# Patient Record
Sex: FEMALE | Race: AMERICAN INDIAN OR ALASKA NATIVE
[De-identification: names, ages, dates, MRNs, and addresses within clinical notes are randomized per-mention and may not be internally consistent; named-entity substitution may affect disease eponyms.]

---

## 2021-01-12 ENCOUNTER — HOSPITAL ENCOUNTER (OUTPATIENT)
Dept: HOSPITAL 5 - TRG | Age: 36
Discharge: HOME | End: 2021-01-12
Attending: OBSTETRICS & GYNECOLOGY
Payer: MEDICAID

## 2021-01-12 VITALS — DIASTOLIC BLOOD PRESSURE: 71 MMHG | SYSTOLIC BLOOD PRESSURE: 126 MMHG

## 2021-01-12 DIAGNOSIS — Z3A.37: ICD-10-CM

## 2021-01-12 DIAGNOSIS — M54.9: ICD-10-CM

## 2021-01-12 DIAGNOSIS — O26.893: Primary | ICD-10-CM

## 2021-01-12 DIAGNOSIS — O47.1: ICD-10-CM

## 2021-01-12 DIAGNOSIS — O09.523: ICD-10-CM

## 2021-01-12 PROCEDURE — 59025 FETAL NON-STRESS TEST: CPT

## 2021-01-12 PROCEDURE — 96372 THER/PROPH/DIAG INJ SC/IM: CPT

## 2021-01-16 ENCOUNTER — HOSPITAL ENCOUNTER (OUTPATIENT)
Dept: HOSPITAL 5 - TRG | Age: 36
Discharge: HOME | End: 2021-01-16
Attending: OBSTETRICS & GYNECOLOGY
Payer: MEDICAID

## 2021-01-16 VITALS — DIASTOLIC BLOOD PRESSURE: 59 MMHG | SYSTOLIC BLOOD PRESSURE: 111 MMHG

## 2021-01-16 DIAGNOSIS — R10.30: ICD-10-CM

## 2021-01-16 DIAGNOSIS — Z3A.37: ICD-10-CM

## 2021-01-16 LAB
BILIRUB UR QL STRIP: (no result)
BLOOD UR QL VISUAL: (no result)
MUCOUS THREADS #/AREA URNS HPF: (no result) /HPF
PH UR STRIP: 7 [PH] (ref 5–7)
PROT UR STRIP-MCNC: (no result) MG/DL
RBC #/AREA URNS HPF: 1 /HPF (ref 0–6)
UROBILINOGEN UR-MCNC: < 2 MG/DL (ref ?–2)
WBC #/AREA URNS HPF: < 1 /HPF (ref 0–6)

## 2021-01-16 PROCEDURE — 81001 URINALYSIS AUTO W/SCOPE: CPT

## 2021-01-16 PROCEDURE — 59025 FETAL NON-STRESS TEST: CPT

## 2021-01-17 ENCOUNTER — HOSPITAL ENCOUNTER (OUTPATIENT)
Dept: HOSPITAL 5 - TRG | Age: 36
LOS: 1 days | Discharge: HOME | End: 2021-01-18
Attending: OBSTETRICS & GYNECOLOGY
Payer: MEDICAID

## 2021-01-17 VITALS — DIASTOLIC BLOOD PRESSURE: 83 MMHG | SYSTOLIC BLOOD PRESSURE: 135 MMHG

## 2021-01-17 DIAGNOSIS — M54.5: ICD-10-CM

## 2021-01-17 DIAGNOSIS — Z3A.37: ICD-10-CM

## 2021-01-17 DIAGNOSIS — R10.9: ICD-10-CM

## 2021-01-17 DIAGNOSIS — O26.893: ICD-10-CM

## 2021-01-17 PROCEDURE — 59025 FETAL NON-STRESS TEST: CPT

## 2021-01-27 ENCOUNTER — HOSPITAL ENCOUNTER (INPATIENT)
Dept: HOSPITAL 5 - TRG | Age: 36
LOS: 2 days | Discharge: HOME | End: 2021-01-29
Attending: OBSTETRICS & GYNECOLOGY | Admitting: OBSTETRICS & GYNECOLOGY
Payer: MEDICAID

## 2021-01-27 DIAGNOSIS — Z80.1: ICD-10-CM

## 2021-01-27 DIAGNOSIS — D62: ICD-10-CM

## 2021-01-27 DIAGNOSIS — U07.1: ICD-10-CM

## 2021-01-27 DIAGNOSIS — Z3A.39: ICD-10-CM

## 2021-01-27 DIAGNOSIS — Z82.49: ICD-10-CM

## 2021-01-27 DIAGNOSIS — Z83.3: ICD-10-CM

## 2021-01-27 LAB
BAND NEUTROPHILS # (MANUAL): 0 K/MM3
HCT VFR BLD CALC: 35.2 % (ref 30.3–42.9)
HCT VFR BLD CALC: 35.7 % (ref 30.3–42.9)
HGB BLD-MCNC: 11.6 GM/DL (ref 10.1–14.3)
HGB BLD-MCNC: 12 GM/DL (ref 10.1–14.3)
MCHC RBC AUTO-ENTMCNC: 33 % (ref 30–34)
MCHC RBC AUTO-ENTMCNC: 34 % (ref 30–34)
MCV RBC AUTO: 90 FL (ref 79–97)
MCV RBC AUTO: 91 FL (ref 79–97)
MYELOCYTES # (MANUAL): 0 K/MM3
PLATELET # BLD: 143 K/MM3 (ref 140–440)
PLATELET # BLD: 170 K/MM3 (ref 140–440)
PROMYELOCYTES # (MANUAL): 0 K/MM3
RBC # BLD AUTO: 3.88 M/MM3 (ref 3.65–5.03)
RBC # BLD AUTO: 3.97 M/MM3 (ref 3.65–5.03)
TOTAL CELLS COUNTED BLD: 100

## 2021-01-27 PROCEDURE — U0003 INFECTIOUS AGENT DETECTION BY NUCLEIC ACID (DNA OR RNA); SEVERE ACUTE RESPIRATORY SYNDROME CORONAVIRUS 2 (SARS-COV-2) (CORONAVIRUS DISEASE [COVID-19]), AMPLIFIED PROBE TECHNIQUE, MAKING USE OF HIGH THROUGHPUT TECHNOLOGIES AS DESCRIBED BY CMS-2020-01-R: HCPCS

## 2021-01-27 PROCEDURE — 85025 COMPLETE CBC W/AUTO DIFF WBC: CPT

## 2021-01-27 PROCEDURE — 36415 COLL VENOUS BLD VENIPUNCTURE: CPT

## 2021-01-27 PROCEDURE — 86901 BLOOD TYPING SEROLOGIC RH(D): CPT

## 2021-01-27 PROCEDURE — 85007 BL SMEAR W/DIFF WBC COUNT: CPT

## 2021-01-27 PROCEDURE — 85014 HEMATOCRIT: CPT

## 2021-01-27 PROCEDURE — 85027 COMPLETE CBC AUTOMATED: CPT

## 2021-01-27 PROCEDURE — 86900 BLOOD TYPING SEROLOGIC ABO: CPT

## 2021-01-27 PROCEDURE — 86850 RBC ANTIBODY SCREEN: CPT

## 2021-01-27 PROCEDURE — 85018 HEMOGLOBIN: CPT

## 2021-01-27 PROCEDURE — C1765 ADHESION BARRIER: HCPCS

## 2021-01-27 RX ADMIN — SODIUM CHLORIDE, SODIUM LACTATE, POTASSIUM CHLORIDE, AND CALCIUM CHLORIDE SCH MLS/HR: .6; .31; .03; .02 INJECTION, SOLUTION INTRAVENOUS at 12:52

## 2021-01-27 RX ADMIN — HYDROMORPHONE HYDROCHLORIDE PRN MG: 2 INJECTION, SOLUTION INTRAMUSCULAR; INTRAVENOUS; SUBCUTANEOUS at 21:58

## 2021-01-27 RX ADMIN — SODIUM CHLORIDE, SODIUM LACTATE, POTASSIUM CHLORIDE, AND CALCIUM CHLORIDE SCH MLS/HR: .6; .31; .03; .02 INJECTION, SOLUTION INTRAVENOUS at 14:07

## 2021-01-27 NOTE — PROGRESS NOTE
Assessment and Plan


A: IUP at 39 2/7 weeks


    Category II tracing


    SROM


    AMA 


    GBS positive





P: Pitocin Discontinued


    O2 Mask


    Internals x 2


    Knee-Chest Maternal Positioning


    Dr. Head notified of Fetal Distress


    Prepare for Delivery by 





Subjective





- Subjective


Date of service: 21 (Called to bedside by RN due to fetal bradycardia)


Interval history: 


Early entry to prenatal care at 11 1/7 weeks to [facility]. Prenatal co

urse complicated by Advanced Maternal Age, anemia (on PO Ferrous Sulfate), and 

Chlamydia (treated on ; negative DALY). History of shoulder dystocia with 

first delivery; baby sustained dislocated shoulder. Second delivery was vaginal 

without complications.


Patient reports: fetal movement normal, other (Resting Well Under Epidural)





Objective





- Vital Signs


Vital Signs: 


                               Vital Signs - 12hr











  21





  12:37 12:42 12:47


 


Temperature   


 


Pulse Rate 88 84 89


 


Respiratory   





Rate   


 


Blood Pressure   


 


Blood Pressure   





[Right]   


 


O2 Sat by Pulse 97 100 99





Oximetry   














  21





  12:50 12:52 12:57


 


Temperature   


 


Pulse Rate 83 82 87


 


Respiratory   





Rate   


 


Blood Pressure 122/79  


 


Blood Pressure   





[Right]   


 


O2 Sat by Pulse  100 99





Oximetry   














  21





  13:02 13:04 13:07


 


Temperature   


 


Pulse Rate 88 94 H 82


 


Respiratory   





Rate   


 


Blood Pressure   


 


Blood Pressure   





[Right]   


 


O2 Sat by Pulse 98 92 98





Oximetry   














  21





  13:09 13:12 13:15


 


Temperature   


 


Pulse Rate 89 94 H 87


 


Respiratory   





Rate   


 


Blood Pressure   


 


Blood Pressure   





[Right]   


 


O2 Sat by Pulse 94 100 86





Oximetry   














  21





  13:16 13:17 13:21


 


Temperature 98.3 F  


 


Pulse Rate 83 83 91 H


 


Respiratory 18  





Rate   


 


Blood Pressure   


 


Blood Pressure 122/79  





[Right]   


 


O2 Sat by Pulse 98 100 93





Oximetry   














  21





  13:22 13:23 13:27


 


Temperature   


 


Pulse Rate 92 H  94 H


 


Respiratory  18 





Rate   


 


Blood Pressure   


 


Blood Pressure   





[Right]   


 


O2 Sat by Pulse 98  98





Oximetry   














  21





  13:32 13:37 13:39


 


Temperature   


 


Pulse Rate 87 86 85


 


Respiratory   





Rate   


 


Blood Pressure   


 


Blood Pressure   





[Right]   


 


O2 Sat by Pulse 98 98 92





Oximetry   














  21





  13:42 13:47 13:52


 


Temperature   


 


Pulse Rate 89 97 H 92 H


 


Respiratory   





Rate   


 


Blood Pressure   


 


Blood Pressure   





[Right]   


 


O2 Sat by Pulse 98 99 97





Oximetry   














  21





  13:57 14:02 14:07


 


Temperature   


 


Pulse Rate 89 88 78


 


Respiratory   





Rate   


 


Blood Pressure   


 


Blood Pressure   





[Right]   


 


O2 Sat by Pulse 98 99 98





Oximetry   














  21





  14:08 14:12 14:14


 


Temperature   


 


Pulse Rate 83 92 H 88


 


Respiratory   





Rate   


 


Blood Pressure 106/60  


 


Blood Pressure   





[Right]   


 


O2 Sat by Pulse 92 94 93





Oximetry   














  21





  14:17 14:19 14:22


 


Temperature   


 


Pulse Rate 70 76 79


 


Respiratory   





Rate   


 


Blood Pressure   


 


Blood Pressure   





[Right]   


 


O2 Sat by Pulse 90 92 99





Oximetry   














  21





  14:23 14:27 14:30


 


Temperature   


 


Pulse Rate  73 


 


Respiratory 18  





Rate   


 


Blood Pressure   


 


Blood Pressure   





[Right]   


 


O2 Sat by Pulse  98 86





Oximetry   














  21





  14:32 14:37 14:38


 


Temperature   


 


Pulse Rate 68 68 67


 


Respiratory   





Rate   


 


Blood Pressure   127/82


 


Blood Pressure   





[Right]   


 


O2 Sat by Pulse 99 87 





Oximetry   














  21





  14:42 14:45 14:48


 


Temperature   


 


Pulse Rate 77 58 L 35 L


 


Respiratory   





Rate   


 


Blood Pressure   


 


Blood Pressure   





[Right]   


 


O2 Sat by Pulse 99 84 84





Oximetry   














  21





  14:51 14:54 14:56


 


Temperature   


 


Pulse Rate  47 L 86


 


Respiratory   





Rate   


 


Blood Pressure   119/76


 


Blood Pressure   





[Right]   


 


O2 Sat by Pulse 87 89 





Oximetry   














  21





  14:57 14:59 15:04


 


Temperature   


 


Pulse Rate 83 97 H 81


 


Respiratory   





Rate   


 


Blood Pressure   


 


Blood Pressure   





[Right]   


 


O2 Sat by Pulse 94 96 96





Oximetry   














  21





  15:05 15:08 15:10


 


Temperature   


 


Pulse Rate 80 85 72


 


Respiratory   





Rate   


 


Blood Pressure  124/84 


 


Blood Pressure   





[Right]   


 


O2 Sat by Pulse 91  96





Oximetry   














  21





  15:11 15:15 15:17


 


Temperature   


 


Pulse Rate 72 101 H 120 H


 


Respiratory   





Rate   


 


Blood Pressure   109/52


 


Blood Pressure   





[Right]   


 


O2 Sat by Pulse 94 99 93





Oximetry   














  21





  15:20 15:24 15:25


 


Temperature   


 


Pulse Rate 89 104 H 111 H


 


Respiratory   





Rate   


 


Blood Pressure   


 


Blood Pressure   





[Right]   


 


O2 Sat by Pulse 92 94 97





Oximetry   














  21





  15:30 15:31 15:35


 


Temperature   


 


Pulse Rate 99 H 106 H 80


 


Respiratory   





Rate   


 


Blood Pressure   


 


Blood Pressure   





[Right]   


 


O2 Sat by Pulse 97 83 L 95





Oximetry   














  21





  15:38 15:40 15:45


 


Temperature   


 


Pulse Rate 94 H 103 H 83


 


Respiratory   





Rate   


 


Blood Pressure 100/61  


 


Blood Pressure   





[Right]   


 


O2 Sat by Pulse  100 98





Oximetry   














  21





  15:48 15:50 15:53


 


Temperature   


 


Pulse Rate 94 H 48 L 


 


Respiratory   





Rate   


 


Blood Pressure   


 


Blood Pressure   





[Right]   


 


O2 Sat by Pulse 90 88 78 L





Oximetry   














  21





  15:55 16:00 16:05


 


Temperature   


 


Pulse Rate 97 H 109 H 75


 


Respiratory   





Rate   


 


Blood Pressure   


 


Blood Pressure   





[Right]   


 


O2 Sat by Pulse 91 93 100





Oximetry   














  21





  16:08 16:10 16:14


 


Temperature   


 


Pulse Rate 89 99 H 


 


Respiratory   





Rate   


 


Blood Pressure 109/67  


 


Blood Pressure   





[Right]   


 


O2 Sat by Pulse 94 98 84





Oximetry   














  21





  16:17 16:20 16:23


 


Temperature   


 


Pulse Rate 95 H  74


 


Respiratory   





Rate   


 


Blood Pressure   


 


Blood Pressure   





[Right]   


 


O2 Sat by Pulse 82 L 93 87





Oximetry   














  21





  16:26 16:30 16:36


 


Temperature   


 


Pulse Rate 84 60 141 H


 


Respiratory   





Rate   


 


Blood Pressure   


 


Blood Pressure   





[Right]   


 


O2 Sat by Pulse 80 L 100 100





Oximetry   














  21





  16:39 16:41 16:42


 


Temperature   


 


Pulse Rate 151 H 75 78


 


Respiratory   





Rate   


 


Blood Pressure 82/51  75/47


 


Blood Pressure   





[Right]   


 


O2 Sat by Pulse  100 





Oximetry   














  21





  16:46


 


Temperature 


 


Pulse Rate 100 H


 


Respiratory 





Rate 


 


Blood Pressure 


 


Blood Pressure 





[Right] 


 


O2 Sat by Pulse 100





Oximetry 














- Exam


Breasts: normal


Abdomen: Present: normal appearance, soft


Uterus: Present: normal, firm, fundal height above umbilicus


FHR: category 2


Uterine Contraction Monitor Mode: Internal


Cervical Dilatation: 4 (leaking a small amount of clear fluid)


Cervical Effacement Percentage: 70


Fetal station: -2


Uterine Contraction Pattern: Regular


Uterine Tone Measurement Phase: Resting


Uterine Contraction Intensity: Moderate


Extremities: normal





- Labs


Labs: 


                         Laboratory Results - last 24 hr











  21





  12:12 12:12


 


WBC  10.4 


 


RBC  3.97 


 


Hgb  12.0 


 


Hct  35.7 


 


MCV  90 


 


MCH  30 


 


MCHC  34 


 


RDW  14.3 


 


Plt Count  170 


 


Blood Type   A POSITIVE


 


Antibody Screen   Negative

## 2021-01-27 NOTE — ANESTHESIA CONSULTATION
Anesthesia Consult and Med Hx


Date of service: 01/27/21





- Airway


Anesthetic Teeth Evaluation: Good


ROM Head & Neck: Adequate


Mental/Hyoid Distance: Adequate


Mallampati Class: Class II


Intubation Access Assessment: Good





- Pulmonary Exam


CTA: Yes





- Cardiac Exam


Cardiac Exam: RRR





- Pre-Operative Health Status


ASA Pre-Surgery Classification: ASA2


Proposed Anesthetic Plan: Epidural





- Pulmonary


Hx Smoking: No


Hx Asthma: No


Hx Respiratory Symptoms: No


SOB: No


COPD: No


Home Oxygen Therapy: No


Hx Pneumonia: No


Hx Sleep Apnea: No





- Cardiovascular System


Hx Hypertension: No


Hx Coronary Artery Disease: No


Hx Heart Attack/AMI: No


Hx Angina: No


Hx Percutaneous Transluminal Coronary Angioplasty (PTCA): No


Hx Cardia Arrhythmia: No


Hx Pacemaker: No


Hx Internal Defibrillator: No


Hx Valvular Heart Disease: No


Hx Heart Murmur: No


Hx Peripheral Vascular Disease: No





- Central Nervous System


Hx Neuromuscular Disorder: No


Hx Seizures: No


CVA: No


Hx Back Pain: No


Hx Psychiatric Problems: No





- Gastrointestinal


Hx Ulcer: No


Hx Gastroesophageal Reflux Disease: Yes





- Endocrine


Hx Renal Disease: No


Hx End Stage Renal Disease: No


Hx Cirrhosis: No


Hx Liver Disease: No


Hx Insulin Dependent Diabetes: No


Hx Non-Insulin Dependent Diabetes: No


Hx Thyroid Disease: No


Hx Hypothyroidism: No


Hx Hyperthyroidism: No





- Hematic


Hx Anemia: No


Hx Sickle Cell Disease: No





- Other Systems


Hx Alcohol Use: No


Hx Substance Use: No


Hx Cancer: No


Hx Obesity: Yes

## 2021-01-27 NOTE — ANESTHESIA DAY OF SURGERY
Anesthesia Day of Surgery





- Day of Surgery


Patient Examined: Yes


Patient H&P Reviewed: Yes


Patient is NPO: Yes


Beta Blockers: No


Cardiac Clearance: No


Pulmonary Clearance: No


Vikash's Test: N/A

## 2021-01-27 NOTE — PROCEDURE NOTE
OB Delivery Note





- Delivery


Date of Delivery: 21


Surgeon: CHIP CESAR


Estimated blood loss: other (800 cc)





-  Section


Preop diagnosis: nonreassuring FHR tracing


Postop diagnosis: same


 section procedure:  section, primary low transverse


Disposition: PACU


Complications: none





- Infant


  ** A


Apgar at 1 minute: 9


Apgar at 5 minutes: 9


Infant Gender: Female

## 2021-01-27 NOTE — PROGRESS NOTE
Subjective


Date of service: 01/27/21


Principal diagnosis: Daniel Block


Interval history: 





Patient consented for TAP block for post surgical pain management.  Patient 

identified, monitors placed, and time out performed.  TAP identified bilaterally

via ultrasound.  Skin prepped bilaterally with [chlorhexidine] and [22g 

stimuplex] needle advanced to the TAP.  [Marcaine 0.22% 35ml] injected under 

ultrasound guidance on the [left] side.  [Marcaine 0.22% 35ml] injected under 

ultrasound guidance on the [right] side. Negative aspiration every 5mL, No 

change in heart rate or rhythm. Patient tolerated the procedure well. No 

apparent complications seen.





Objective





- Constitutional


Vitals: 


                               Vital Signs - 12hr











  01/27/21 01/27/21 01/27/21





  12:37 12:42 12:47


 


Temperature   


 


Pulse Rate 88 84 89


 


Respiratory   





Rate   


 


Blood Pressure   


 


Blood Pressure   





[Right]   


 


O2 Sat by Pulse 97 100 99





Oximetry   














  01/27/21 01/27/21 01/27/21





  12:50 12:52 12:57


 


Temperature   


 


Pulse Rate 83 82 87


 


Respiratory   





Rate   


 


Blood Pressure 122/79  


 


Blood Pressure   





[Right]   


 


O2 Sat by Pulse  100 99





Oximetry   














  01/27/21 01/27/21 01/27/21





  13:02 13:04 13:07


 


Temperature   


 


Pulse Rate 88 94 H 82


 


Respiratory   





Rate   


 


Blood Pressure   


 


Blood Pressure   





[Right]   


 


O2 Sat by Pulse 98 92 98





Oximetry   














  01/27/21 01/27/21 01/27/21





  13:09 13:12 13:15


 


Temperature   


 


Pulse Rate 89 94 H 87


 


Respiratory   





Rate   


 


Blood Pressure   


 


Blood Pressure   





[Right]   


 


O2 Sat by Pulse 94 100 86





Oximetry   














  01/27/21 01/27/21 01/27/21





  13:16 13:17 13:21


 


Temperature 98.3 F  


 


Pulse Rate 83 83 91 H


 


Respiratory 18  





Rate   


 


Blood Pressure   


 


Blood Pressure 122/79  





[Right]   


 


O2 Sat by Pulse 98 100 93





Oximetry   














  01/27/21 01/27/21 01/27/21





  13:22 13:23 13:27


 


Temperature   


 


Pulse Rate 92 H  94 H


 


Respiratory  18 





Rate   


 


Blood Pressure   


 


Blood Pressure   





[Right]   


 


O2 Sat by Pulse 98  98





Oximetry   














  01/27/21 01/27/21 01/27/21





  13:32 13:37 13:39


 


Temperature   


 


Pulse Rate 87 86 85


 


Respiratory   





Rate   


 


Blood Pressure   


 


Blood Pressure   





[Right]   


 


O2 Sat by Pulse 98 98 92





Oximetry   














  01/27/21 01/27/21 01/27/21





  13:42 13:47 13:52


 


Temperature   


 


Pulse Rate 89 97 H 92 H


 


Respiratory   





Rate   


 


Blood Pressure   


 


Blood Pressure   





[Right]   


 


O2 Sat by Pulse 98 99 97





Oximetry   














  01/27/21 01/27/21 01/27/21





  13:57 14:02 14:07


 


Temperature   


 


Pulse Rate 89 88 78


 


Respiratory   





Rate   


 


Blood Pressure   


 


Blood Pressure   





[Right]   


 


O2 Sat by Pulse 98 99 98





Oximetry   














  01/27/21 01/27/21 01/27/21





  14:08 14:12 14:14


 


Temperature   


 


Pulse Rate 83 92 H 88


 


Respiratory   





Rate   


 


Blood Pressure 106/60  


 


Blood Pressure   





[Right]   


 


O2 Sat by Pulse 92 94 93





Oximetry   














  01/27/21 01/27/21 01/27/21





  14:17 14:19 14:22


 


Temperature   


 


Pulse Rate 70 76 79


 


Respiratory   





Rate   


 


Blood Pressure   


 


Blood Pressure   





[Right]   


 


O2 Sat by Pulse 90 92 99





Oximetry   














  01/27/21 01/27/21 01/27/21





  14:23 14:27 14:30


 


Temperature   


 


Pulse Rate  73 


 


Respiratory 18  





Rate   


 


Blood Pressure   


 


Blood Pressure   





[Right]   


 


O2 Sat by Pulse  98 86





Oximetry   














  01/27/21 01/27/21 01/27/21





  14:32 14:37 14:38


 


Temperature   


 


Pulse Rate 68 68 67


 


Respiratory   





Rate   


 


Blood Pressure   127/82


 


Blood Pressure   





[Right]   


 


O2 Sat by Pulse 99 87 





Oximetry   














  01/27/21 01/27/21 01/27/21





  14:42 14:45 14:48


 


Temperature   


 


Pulse Rate 77 58 L 35 L


 


Respiratory   





Rate   


 


Blood Pressure   


 


Blood Pressure   





[Right]   


 


O2 Sat by Pulse 99 84 84





Oximetry   














  01/27/21 01/27/21 01/27/21





  14:51 14:54 14:56


 


Temperature   


 


Pulse Rate  47 L 86


 


Respiratory   





Rate   


 


Blood Pressure   119/76


 


Blood Pressure   





[Right]   


 


O2 Sat by Pulse 87 89 





Oximetry   














  01/27/21 01/27/21 01/27/21





  14:57 14:59 15:04


 


Temperature   


 


Pulse Rate 83 97 H 81


 


Respiratory   





Rate   


 


Blood Pressure   


 


Blood Pressure   





[Right]   


 


O2 Sat by Pulse 94 96 96





Oximetry   














  01/27/21 01/27/21 01/27/21





  15:05 15:08 15:10


 


Temperature   


 


Pulse Rate 80 85 72


 


Respiratory   





Rate   


 


Blood Pressure  124/84 


 


Blood Pressure   





[Right]   


 


O2 Sat by Pulse 91  96





Oximetry   














  01/27/21 01/27/21 01/27/21





  15:11 15:15 15:17


 


Temperature   


 


Pulse Rate 72 101 H 120 H


 


Respiratory   





Rate   


 


Blood Pressure   109/52


 


Blood Pressure   





[Right]   


 


O2 Sat by Pulse 94 99 93





Oximetry   














  01/27/21 01/27/21 01/27/21





  15:20 15:24 15:25


 


Temperature   


 


Pulse Rate 89 104 H 111 H


 


Respiratory   





Rate   


 


Blood Pressure   


 


Blood Pressure   





[Right]   


 


O2 Sat by Pulse 92 94 97





Oximetry   














  01/27/21 01/27/21 01/27/21





  15:30 15:31 15:35


 


Temperature   


 


Pulse Rate 99 H 106 H 80


 


Respiratory   





Rate   


 


Blood Pressure   


 


Blood Pressure   





[Right]   


 


O2 Sat by Pulse 97 83 L 95





Oximetry   














  01/27/21 01/27/21 01/27/21





  15:38 15:40 15:45


 


Temperature   


 


Pulse Rate 94 H 103 H 83


 


Respiratory   





Rate   


 


Blood Pressure 100/61  


 


Blood Pressure   





[Right]   


 


O2 Sat by Pulse  100 98





Oximetry   














  01/27/21 01/27/21 01/27/21





  15:48 15:50 15:53


 


Temperature   


 


Pulse Rate 94 H 48 L 


 


Respiratory   





Rate   


 


Blood Pressure   


 


Blood Pressure   





[Right]   


 


O2 Sat by Pulse 90 88 78 L





Oximetry   














  01/27/21 01/27/21 01/27/21





  15:55 16:00 16:05


 


Temperature   


 


Pulse Rate 97 H 109 H 75


 


Respiratory   





Rate   


 


Blood Pressure   


 


Blood Pressure   





[Right]   


 


O2 Sat by Pulse 91 93 100





Oximetry   














  01/27/21 01/27/21 01/27/21





  16:08 16:10 16:14


 


Temperature   


 


Pulse Rate 89 99 H 


 


Respiratory   





Rate   


 


Blood Pressure 109/67  


 


Blood Pressure   





[Right]   


 


O2 Sat by Pulse 94 98 84





Oximetry   














  01/27/21 01/27/21 01/27/21





  16:17 16:20 16:23


 


Temperature   


 


Pulse Rate 95 H  74


 


Respiratory   





Rate   


 


Blood Pressure   


 


Blood Pressure   





[Right]   


 


O2 Sat by Pulse 82 L 93 87





Oximetry   














  01/27/21 01/27/21 01/27/21





  16:26 16:30 16:36


 


Temperature   


 


Pulse Rate 84 60 141 H


 


Respiratory   





Rate   


 


Blood Pressure   


 


Blood Pressure   





[Right]   


 


O2 Sat by Pulse 80 L 100 100





Oximetry   














  01/27/21 01/27/21 01/27/21





  16:39 16:41 16:42


 


Temperature   


 


Pulse Rate 151 H 75 78


 


Respiratory   





Rate   


 


Blood Pressure 82/51  75/47


 


Blood Pressure   





[Right]   


 


O2 Sat by Pulse  100 





Oximetry   














  01/27/21 01/27/21 01/27/21





  16:46 16:51 16:55


 


Temperature   


 


Pulse Rate 100 H 101 H 85


 


Respiratory   





Rate   


 


Blood Pressure   


 


Blood Pressure   





[Right]   


 


O2 Sat by Pulse 100 99 70 L





Oximetry   














  01/27/21 01/27/21 01/27/21





  16:56 18:29 18:45


 


Temperature  97.3 F L 


 


Pulse Rate 94 H 120 H 123 H


 


Respiratory  10 L 13





Rate   


 


Blood Pressure  119/86 105/63


 


Blood Pressure   





[Right]   


 


O2 Sat by Pulse 99 100 100





Oximetry   














- Labs


CBC & Chem 7: 


                                 01/27/21 12:12

## 2021-01-27 NOTE — HISTORY AND PHYSICAL REPORT
History of Present Illness


Date of examination: 21


Chief complaint: 


Contractions and "my water broke at 9:00 this morning." 


History of present illness: 


Early entry to prenatal care at 11 1/7 weeks to [facility]. Prenatal 

course complicated by Advanced Maternal Age, anemia (on PO Ferrous Sulfate), and

Chlamydia (treated on ; negative DALY). History of shoulder dystocia with 

first delivery; baby sustained dislocated shoulder. Second delivery was vaginal 

without complications.





Past History


Past Medical History: no pertinent history


Past Surgical History: no surgical history


GYN History: chlamydia, other (mycoplasma infection)


Family/Genetic History: diabetes (Aunt), hypertension (Maternal Grandmother, 

Aunt), cancer (Lung - Maternal Grandfather)


Social history: no significant social history





- Obstetrical History


Expected Date of Delivery: 21


Actual Gestation: 39 Week(s) 2 Day(s) 


: 3


Para: 2


Hx # Term Pregnancies: 2


Number of  Pregnancies: 0


Spontaneous Abortions: 0


Induced : 0


Number of Living Children: 2


  ** #1


Infant Gender: Male


Birth year: [birth year]


Birthweight: 3.714 kg


Method of Delivery: Vaginal


Gestational age at delivery: 40


Complications: other (shoulder dystocia; baby sustained dislocated shoulder)





  ** #2


Infant Gender: Female


Birth year: [birth year]


Birthweight: 2.863 kg


Method of Delivery: Vaginal


Gestational age at delivery: 38


Complications: none





Medications and Allergies


                                    Allergies











Allergy/AdvReac Type Severity Reaction Status Date / Time


 


No Known Allergies Allergy   Unverified 21 12:29











Active Meds: 


Active Medications





Butorphanol Tartrate (Butorphanol 2 Mg/1 Ml Inj)  2 mg IV Q2H PRN


   PRN Reason: Pain , Severe (7-10)


Ephedrine Sulfate (Ephedrine Sulfate 50 Mg/1 Ml Inj)  10 mg IV Q2M PRN


   PRN Reason: Hypotension


Oxytocin/Sodium Chloride (Pitocin/Ns 30 Unit/500ml)  30 units in 500 mls @ 2 

mls/hr IV TITR VANESSA; Protocol


Lactated Ringer's (Lactated Ringers)  1,000 mls @ 125 mls/hr IV AS DIRECT VANESSA


Ampicillin Sodium (Ampicillin/Ns 2 Gm/100 Ml)  2 gm in 100 mls @ 100 mls/hr IV 

ONCE ONE; Protocol


   Stop: 21 12:58


Ampicillin Sodium (Ampicillin/Ns 1 Gm/50 Ml)  1 gm in 50 mls @ 100 mls/hr IV Q4H

VANESSA; Protocol


Mineral Oil (Mineral Oil 30 Ml Oral Liqd)  30 ml PO QHS PRN


   PRN Reason: Constipation


Terbutaline Sulfate (Terbutaline 1 Mg/1 Ml Inj)  0.25 mg SUB-Q ONCE PRN


   PRN Reason: Hyperstimulation/Hypertonicity











Review of Systems


All systems: negative





- Physical Exam


Breasts: Positive: normal


Cardiovascular: Regular rate


Lungs: Positive: Clear to auscultation, Normal air movement


Abdomen: Positive: normal appearance, soft


Genitourinary (Female): Positive: normal external genitalia, normal perenium


Vagina: Positive: normal moisture


Uterus: Positive: enlarged


Anus/Rectum: Positive: hemorrhoids


Extremities: Positive: normal





- Obstetrical


FHR: category 1


Uterine Contraction Monitor Mode: External


Cervical Dilatation: 4 (per RN exam. Pt reports SROM at 09:00 today at home. 

Nitrizine test positive per RN. Leaking a small amount of clear fluid on exam.)


Cervical Effacement Percentage: 80


Fetal station: -2


Uterine Contraction Frequency (min): 3


Uterine Contraction Duration: 60


Uterine Contraction Pattern: Regular


Uterine Tone Measurement Phase: Resting


Uterine Contraction Intensity: Moderate





Results


All other labs normal.








Assessment and Plan


A: IUP at 39 2/7 weeks


    Category I tracing


    Active Labor


    AMA 


    GBS positive





P: Admit to L&D per routine orders


   Expectant Management


   Prepare for Epidural


   GBS Prophylaxis

## 2021-01-27 NOTE — PROCEDURE NOTE
OB Delivery Note





- Delivery


Date of Delivery: 21


Surgeon: CHIP CESAR


Estimated blood loss: other (800 cc)





-  Section


Preop diagnosis: nonreassuring FHR tracing


Postop diagnosis: same


 section procedure:  section, primary low transverse


Disposition: PACU


Complications: none


Narrative: 





Indication: 35-year-old  at 39 weeks and 2 days with nonreassuring fetal 

heart tracing remote from delivery.  As result patient taken for primary low 

transverse .  Prior to the surgery, patient was fully consented.  

Risks, benefits, and alternatives were all discussed with the patient including 

risk of bleeding, infection, and potential for injury.  Patient understands and 

accepts these risks.  Patient agrees to proceed with surgery.  All questions 

were answered.





Findings:  Normal uterus, tubes and ovaries.  Clear fluid.  Tight nuchal cord 

x1. 





Procedure: Patient taken to the operating room and prepped and draped in the 

usual fashion.  Pfannenstiel skin incision was made and carried down to the 

underlying fascia.  Fascia was incised and the incision was extended 

bilaterally.  Rectus fascia dissected off the rectus muscle both superiorly and 

inferiorly.  Peritoneum identified tented up and entered.  Peritoneal incision 

extended superiorly and inferiorly with good visualization of the bladder.  

Bladder blade was placed.  Uterine incision was made and the incision was 

extended bilaterally.





The baby was delivered in the typical vertex fashion.  Baby bulb suctioned at 

the incision site and again after delivery.  Cord was delayed clamped and cut 

and handed off to waiting  team.





The placenta was delivered spontaneously.  The uterus was exteriorized and 

cleared of all clots and debris.  Uterine incision closed with 0 Vicryl in a 

running locked fashion followed by a second imbricating layer of 0 Vicryl.  Good

hemostasis was noted.  Her urine was clear.  Uterus tubes and ovaries were 

returned to the abdominal cavity.  Gutters were cleared of all clots and debris.

 Good hemostasis noted.  Interceed placed over the uterine incision and over the

lower uterine segment in the midline.  Attention was turned to the rectus fascia

which was reapproximated with 0 Vicryl in a running fashion.  Subcutaneous 

tissue was irrigated and reapproximated with 2-0 Vicryl in a running fashion.  

Skin was closed with 4-0 Vicryl in a subcuticular fashion followed by Dermabond.

 The procedure was concluded at this point and the patient tolerated the pr

ocedure well.  All instrument and lap counts were correct.











- Infant


  ** A


Apgar at 1 minute: 9


Apgar at 5 minutes: 9


Infant Gender: Female

## 2021-01-28 LAB
HCT VFR BLD CALC: 29.4 % (ref 30.3–42.9)
HGB BLD-MCNC: 10 GM/DL (ref 10.1–14.3)

## 2021-01-28 RX ADMIN — OXYCODONE AND ACETAMINOPHEN PRN TAB: 5; 325 TABLET ORAL at 23:42

## 2021-01-28 RX ADMIN — HYDROMORPHONE HYDROCHLORIDE PRN MG: 2 INJECTION, SOLUTION INTRAMUSCULAR; INTRAVENOUS; SUBCUTANEOUS at 05:58

## 2021-01-28 RX ADMIN — HYDROMORPHONE HYDROCHLORIDE PRN MG: 2 INJECTION, SOLUTION INTRAMUSCULAR; INTRAVENOUS; SUBCUTANEOUS at 02:07

## 2021-01-28 RX ADMIN — OXYCODONE AND ACETAMINOPHEN PRN TAB: 5; 325 TABLET ORAL at 09:28

## 2021-01-28 RX ADMIN — KETOROLAC TROMETHAMINE PRN MG: 30 INJECTION, SOLUTION INTRAMUSCULAR at 06:38

## 2021-01-28 RX ADMIN — KETOROLAC TROMETHAMINE PRN MG: 30 INJECTION, SOLUTION INTRAMUSCULAR at 00:10

## 2021-01-28 RX ADMIN — IBUPROFEN PRN MG: 800 TABLET, FILM COATED ORAL at 21:04

## 2021-01-28 RX ADMIN — IBUPROFEN PRN MG: 800 TABLET, FILM COATED ORAL at 13:53

## 2021-01-28 RX ADMIN — OXYCODONE AND ACETAMINOPHEN PRN TAB: 5; 325 TABLET ORAL at 15:42

## 2021-01-28 RX ADMIN — SODIUM CHLORIDE, SODIUM LACTATE, POTASSIUM CHLORIDE, AND CALCIUM CHLORIDE SCH MLS/HR: .6; .31; .03; .02 INJECTION, SOLUTION INTRAVENOUS at 00:10

## 2021-01-28 NOTE — PROGRESS NOTE
Assessment and Plan





A: S/P Primary LTCS





p: Continue routine pp care


    Encourage ambulation


    D/C home within 24-48 hrs if stable





Subjective





- Subjective


Date of service: 21


Principal diagnosis: Daniel Block


Patient reports: voiding normally, pain well controlled, ambulating normally, 

other (hasn't passed gas yet)


[place]: doing well, bottle feeding





Objective





- Vital Signs


Latest vital signs: 


                                   Vital Signs











  Temp Pulse Resp BP BP Pulse Ox


 


 21 08:11  98.1 F  80  18  120/75   99


 


 21 04:45  97.6 F  72  20  110/62   99


 


 21 02:11  97.9 F  78  20  122/78   100


 


 21 21:12  98.0 F  79  18   125/58  100


 


 21 19:05   85  10 L  94/69   99


 


 21 18:50   120 H  11 L  104/75   99


 


 21 18:45   123 H  13  105/63   100


 


 21 18:29  97.3 F L  120 H  10 L  119/86   100


 


 21 16:56   94 H     99


 


 21 16:55   85     70 L


 


 21 16:51   101 H     99


 


 21 16:46   100 H     100


 


 21 16:42   78   75/47  


 


 21 16:41   75     100


 


 21 16:39   151 H   82/51  


 


 21 16:36   141 H     100


 


 21 16:30   60     100


 


 21 16:26   84     80 L


 


 21 16:23   74     87


 


 21 16:20       93


 


 21 16:17   95 H     82 L


 


 21 16:14       84


 


 21 16:10   99 H     98


 


 21 16:08   89   109/67   94


 


 21 16:05   75     100


 


 21 16:00   109 H     93


 


 21 15:55   97 H     91


 


 21 15:53       78 L


 


 21 15:50   48 L     88


 


 21 15:48   94 H     90


 


 21 15:45   83     98


 


 21 15:40   103 H     100


 


 21 15:38   94 H   100/61  


 


 21 15:35   80     95


 


 21 15:31   106 H     83 L


 


 21 15:30   99 H     97


 


 21 15:25   111 H     97


 


 21 15:24   104 H     94


 


 21 15:20   89     92


 


 21 15:17   120 H   109/52   93


 


 21 15:15   101 H     99


 


 21 15:11   72     94


 


 21 15:10   72     96


 


 21 15:08   85   124/84  


 


 21 15:05   80     91


 


 21 15:04   81     96


 


 21 14:59   97 H     96


 


 21 14:57   83     94


 


 21 14:56   86   119/76  


 


 21 14:54   47 L     89


 


 21 14:51       87


 


 21 14:48   35 L     84


 


 21 14:45   58 L     84


 


 21 14:42   77     99


 


 21 14:38   67   127/82  


 


 21 14:37   68     87


 


 21 14:32   68     99


 


 21 14:30       86


 


 21 14:27   73     98


 


 21 14:23    18   


 


 21 14:22   79     99


 


 21 14:19   76     92


 


 21 14:17   70     90


 


 21 14:14   88     93


 


 21 14:12   92 H     94


 


 21 14:08   83   106/60   92


 


 21 14:07   78     98


 


 21 14:02   88     99


 


 21 13:57   89     98


 


 21 13:52   92 H     97


 


 21 13:47   97 H     99


 


 21 13:42   89     98


 


 21 13:39   85     92


 


 21 13:37   86     98


 


 21 13:32   87     98


 


 21 13:27   94 H     98


 


 21 13:23    18   


 


 21 13:22   92 H     98


 


 21 13:21   91 H     93


 


 21 13:17   83     100


 


 21 13:16  98.3 F  83  18   122/79  98


 


 21 13:15   87     86


 


 21 13:12   94 H     100


 


 21 13:09   89     94


 


 21 13:07   82     98


 


 21 13:04   94 H     92


 


 21 13:02   88     98


 


 21 12:57   87     99


 


 21 12:52   82     100


 


 21 12:50   83   122/79  


 


 21 12:47   89     99


 


 21 12:42   84     100


 


 21 12:37   88     97








                                Intake and Output











 21





 22:59 06:59 14:59


 


Intake Total 3200 480 120


 


Output Total 650 350 


 


Balance 2550 130 120


 


Intake:   


 


  IV 3200  


 


    Lactated Ringers 1,000 ml 1000  





    @ 125 mls/hr IV AS   





    DIRECT VANESSA Rx#:[id number]   


 


  Oral  480 120


 


Output:   


 


  Urine 650 350 


 


    Indwelling Catheter  350 


 


Other:   


 


  Total, Intake Amount  240 120


 


  Total, Output Amount  350 














- Exam


Breasts: Present: normal


Abdomen: Present: normal appearance, soft, normal bowel sounds


Vulva: both: normal


Uterus: Present: normal, firm, fundal height below umbilicus


Extremities: Present: normal


Incision: Present: normal, dry, intact, dressed





- Labs


Labs: 


                              Abnormal lab results











  21 Range/Units





  20:50 07:41 


 


WBC  17.4 H   (4.5-11.0)  K/mm3


 


Hgb   10.0 L  (10.1-14.3)  gm/dl


 


Hct   29.4 L  (30.3-42.9)  %


 


Seg Neuts % (Manual)  91.0 H   (40.0-70.0)  %


 


Lymphocytes % (Manual)  5.0 L   (13.4-35.0)  %


 


Seg Neutrophils # Man  15.8 H   (1.8-7.7)  K/mm3


 


Lymphocytes # (Manual)  0.9 L   (1.2-5.4)  K/mm3

## 2021-01-29 VITALS — DIASTOLIC BLOOD PRESSURE: 85 MMHG | SYSTOLIC BLOOD PRESSURE: 126 MMHG

## 2021-01-29 RX ADMIN — IBUPROFEN PRN MG: 800 TABLET, FILM COATED ORAL at 09:15

## 2021-01-29 RX ADMIN — OXYCODONE AND ACETAMINOPHEN PRN TAB: 5; 325 TABLET ORAL at 05:31

## 2021-01-29 NOTE — DISCHARGE SUMMARY
Providers





- Providers


Date of Admission: 


21 11:59





Date of discharge: 21


Attending physician: 


CHIP CESAR





Primary care physician: 


LIT WEAVER








Hospitalization


Reason for admission: rupture of membranes


Delivery: 


Procedure: primary low transverse


Episiotomy: none


Laceration: none


Incision: dry, intact


Other postpartum procedures: none


Postpartum complications: none


Discharge diagnosis: IUP at term delivered


[place] baby: female


Hospital course: 





See admission H & P; OB operative summary and PP progress notes


Condition at discharge: Stable


Disposition: DC-01 TO HOME OR SELFCARE





- Discharge Diagnoses


(1) Status post primary low transverse  section


Status: Acute   





(2) Anemia


Status: Acute   


Qualifiers: 


   Anemia type: other cause   Other causes of anemia: acute posthemorrhagic   

Qualified Code(s): D62 - Acute posthemorrhagic anemia   


Comment: Asymptomatic   





(3) COVID-19 determined by clinical diagnostic criteria


Status: Acute   





Plan





- Discharge Medications


Prescriptions: 


Ibuprofen [Motrin 800 MG tab] 800 mg PO Q6H PRN #30 tablet


 PRN Reason: Pain, Mild (1-3)


oxyCODONE /ACETAMINOPHEN [Percocet 5/325 mg] 1 tab PO Q6H PRN #30 tablet


 PRN Reason: Pain, Moderate (4-6)





- Provider Discharge Summary


Activity: routine, no sex for 6 weeks, no heavy lifting 4 weeks, no strenuous 

exercise


Diet: other (Iron rich diet)


Instructions: routine


Additional instructions: 


[]  Smoking cessation referral if applicable(refer to patient education folder 

for contact #)


[]  Refer to [facility]'s [facility] Booklet








Call your doctor immediately for:


* Fever > 100.5


* Heavy vaginal bleeding ( >1 pad per hour)


* Severe persistent headache


* Shortness of breath


* Reddened, hot, painful area to leg or breast


* Drainage or odor from incision.





* Keep incision clean and dry at all times and follow doctor's instructions 

regarding bathing/showering


* Maintain quarantine status for 14 days following discharge 











- Follow up plan


Follow up: 


CHIP CESAR MD [Staff Physician] - 14 Days